# Patient Record
Sex: MALE | Race: BLACK OR AFRICAN AMERICAN | URBAN - METROPOLITAN AREA
[De-identification: names, ages, dates, MRNs, and addresses within clinical notes are randomized per-mention and may not be internally consistent; named-entity substitution may affect disease eponyms.]

---

## 2018-08-09 ENCOUNTER — OFFICE VISIT (OUTPATIENT)
Dept: FAMILY MEDICINE CLINIC | Age: 23
End: 2018-08-09

## 2018-08-09 VITALS
WEIGHT: 180 LBS | DIASTOLIC BLOOD PRESSURE: 79 MMHG | OXYGEN SATURATION: 99 % | RESPIRATION RATE: 18 BRPM | HEIGHT: 70 IN | TEMPERATURE: 98 F | SYSTOLIC BLOOD PRESSURE: 124 MMHG | HEART RATE: 72 BPM | BODY MASS INDEX: 25.77 KG/M2

## 2018-08-09 DIAGNOSIS — Z13.220 ENCOUNTER FOR SCREENING FOR LIPID DISORDER: ICD-10-CM

## 2018-08-09 DIAGNOSIS — Z11.3 ROUTINE SCREENING FOR STI (SEXUALLY TRANSMITTED INFECTION): ICD-10-CM

## 2018-08-09 DIAGNOSIS — Z00.00 ROUTINE GENERAL MEDICAL EXAMINATION AT A HEALTH CARE FACILITY: Primary | ICD-10-CM

## 2018-08-09 RX ORDER — CREATINE MONOHYDRATE 5000 MG
POWDER IN PACKET (EA) ORAL
COMMUNITY

## 2018-08-09 RX ORDER — BISMUTH SUBSALICYLATE 262 MG
1 TABLET,CHEWABLE ORAL DAILY
COMMUNITY

## 2018-08-09 NOTE — PROGRESS NOTES
[facility] Note  HPI:   Itzel Fajardo is a 23 y.o. male who presents to establish care and for CPE. Previous provider: PCP in [place]. He would like STI screening today. New committed relationship with female partner. No symptoms of STI such as dysuria, discharge or lesions. History of a persistently low blood cell count, he believes hgb. Has a normal bone marrow bx in high school. Denies any other PMH. Recent illness, onset 5 weeks ago, lasting for 1-2 weeks. High fever of 104 F, night sweats, headache, sore throat. A few days prior to illness he noticed 2 painful red bumps. One on back of left thigh that was raised, dime sized, and one bump on the back of left upper calf, raised red bump about a quarter size with a white head that eventually drained. Presented to Urgent treatment center, diagnosed with viral illness, negative for strep, and advised hydrocortisone cream to bumps. Symptoms have completely resolved and he feels back in good health. Lesions have healed. Otherwise in good health with no other complaints. No Known Allergies   There is no problem list on file for this patient. History reviewed. No pertinent past medical history. Past Surgical History:   Procedure Laterality Date    HX WISDOM TEETH EXTRACTION        No LMP for male patient.    Family History   Problem Relation Age of Onset    No Known Problems Mother     Hypertension Father     No Known Problems Sister     No Known Problems Brother     No Known Problems Maternal Grandmother     No Known Problems Maternal Grandfather     No Known Problems Paternal Grandmother     No Known Problems Paternal Grandfather     No Known Problems Sister     No Known Problems Sister     No Known Problems Sister       Social History     Social History    Marital status: SINGLE     Spouse name: N/A    Number of children: N/A    Years of education: N/A     Occupational History    Not on file. Social History Main Topics    Smoking status: Former Smoker     Types: Cigarettes    Smokeless tobacco: Never Used    Alcohol use No    Drug use: No    Sexual activity: Yes     Partners: Female     Other Topics Concern    Not on file     Social History Narrative    Moved from [place] for [employer] one     Exercise: 4-5 times per week. Diet: Counting macros, once per day                 ROS:   Review of Systems   Constitutional: Negative for chills, diaphoresis, fever, malaise/fatigue and weight loss. No unexplained weight changes   HENT: Negative for congestion, ear pain, hearing loss, nosebleeds, sore throat and tinnitus. Eyes: Negative for blurred vision, double vision and pain. Respiratory: Negative for cough, shortness of breath and wheezing. Cardiovascular: Negative for chest pain, palpitations, orthopnea, claudication and leg swelling. No dyspnea or chest pain with exertion, syncope   Gastrointestinal: Negative for abdominal pain, blood in stool, constipation, diarrhea, heartburn, nausea and vomiting. Genitourinary: Negative for dysuria, frequency and hematuria. Musculoskeletal: Negative for joint pain and myalgias. Skin: Negative for rash. Neurological: Negative for dizziness, speech change, seizures, weakness and headaches. Endo/Heme/Allergies: Negative for polydipsia. Psychiatric/Behavioral: Negative for depression. The patient is not nervous/anxious. Physical Exam:     Visit Vitals    /79 (BP 1 Location: Left arm, BP Patient Position: Sitting)    Pulse 72    Temp 98 °F (36.7 °C) (Oral)    Resp 18    Ht 5' 10\" (1.778 m)    Wt 180 lb (81.6 kg)    SpO2 99%    BMI 25.83 kg/m2        Vitals and Nurse Documentation reviewed. Physical Exam   Constitutional: He is oriented to person, place, and time and well-developed, well-nourished, and in no distress. HENT:   Head: Normocephalic and atraumatic.    Right Ear: Hearing, external ear and ear canal normal. Tympanic membrane is not erythematous. No middle ear effusion. Left Ear: Hearing, external ear and ear canal normal. Tympanic membrane is not erythematous. No middle ear effusion. Nose: Nose normal.   Mouth/Throat: Uvula is midline and oropharynx is clear and moist. Mucous membranes are not pale, not dry and not cyanotic. Normal dentition. No dental caries. No oropharyngeal exudate. Eyes: Conjunctivae and lids are normal. Pupils are equal, round, and reactive to light. Right conjunctiva is not injected. Left conjunctiva is not injected. Right eye exhibits normal extraocular motion. Neck: Normal range of motion and phonation normal. Neck supple. No tracheal deviation present. No thyroid mass and no thyromegaly present. Cardiovascular: Normal rate, regular rhythm, S1 normal, S2 normal, normal heart sounds and intact distal pulses. Exam reveals no gallop and no friction rub. No murmur heard. Pulmonary/Chest: Effort normal and breath sounds normal. No accessory muscle usage. No respiratory distress. He has no decreased breath sounds. He has no wheezes. He has no rhonchi. He has no rales. He exhibits no tenderness. Abdominal: Soft. Normal appearance and bowel sounds are normal. He exhibits no abdominal bruit. There is no hepatosplenomegaly. There is no tenderness. Musculoskeletal: Normal range of motion. He exhibits no edema, tenderness or deformity. Lymphadenopathy:        Head (right side): No submental, no submandibular, no tonsillar, no preauricular, no posterior auricular and no occipital adenopathy present. Head (left side): No submental, no submandibular, no tonsillar, no preauricular, no posterior auricular and no occipital adenopathy present. He has no cervical adenopathy. Right: No supraclavicular adenopathy present. Left: No supraclavicular adenopathy present. Neurological: He is alert and oriented to person, place, and time.  He has normal strength, normal reflexes and intact cranial nerves. Gait normal.   Skin: Skin is warm, dry and intact. No rash noted. He is not diaphoretic. No cyanosis. Nails show no clubbing. Psychiatric: Mood and affect normal.   Nursing note and vitals reviewed. Assessment/ Plan:   [employer] were discussed including hours of operation, on-call providers, and MyChart. Diagnoses and all orders for this visit:    1. Routine general medical examination at a health care facility: Healthy 23year old male, fully recovered from recent viral illness 5 weeks ago. Encouraged to RTC if he develops new or returning symptoms. Routine screening labs today. -     METABOLIC PANEL, COMPREHENSIVE  -     CBC WITH AUTOMATED DIFF    2. Encounter for screening for lipid disorder: Non-fasting labs today, [employer] 2-3 hours ago. -     LIPID PANEL    3. Routine screening for STI (sexually transmitted infection) :Discussed safe sex practices.    -     RPR  -     CHRONIC HEPATITIS PANEL  -     HIV 1/2 AG/AB, 4TH GENERATION,W RFLX CONFIRM  -     CT+NG+M GENITALIUM BY YUNIER, UR      Follow-up Disposition:  Return in about 1 year (around 8/9/2019) for Routine Physical Exam.     Discussed expected course/resolution/complications of diagnosis in detail with patient.    Medication risks/benefits/costs/interactions/alternatives discussed with patient.    Pt was given an after visit summary which includes diagnoses, current medications & vitals.  Pt expressed understanding with the diagnosis and plan

## 2018-08-09 NOTE — PATIENT INSTRUCTIONS

## 2018-08-09 NOTE — PROGRESS NOTES
Chief Complaint   Patient presents with   [facility] Establish Care     patient fasting     Complete Physical     1. Have you been to the ER, urgent care clinic since your last visit? Hospitalized since your last visit? No Urgent care 1 month ago for fever. 2. Have you seen or consulted any other health care providers outside of the [address] [place] [address] since your last visit? Include any pap smears or colon screening.  No

## 2018-08-09 NOTE — MR AVS SNAPSHOT
[address] 
[phone] Patient: Georgi Mohs MRN: [id number] :1995 Visit Information Date & Time Provider Department Dept. Phone Encounter #  
 2018  1:00 PM Juanita Boland  [facility] [phone] [id number] Follow-up Instructions Return in about 1 year (around 2019) for Routine Physical Exam. Upcoming Health Maintenance Date Due DTaP/Tdap/Td series (1 - Tdap) 2014 Influenza Age 5 to Adult 2018* *Topic was postponed. The date shown is not the original due date. Allergies as of 2018  Review Complete On: 2018 By: Juanita Boland NP No Known Allergies Current Immunizations  Never Reviewed No immunizations on file. Not reviewed this visit You Were Diagnosed With   
  
 Codes Comments Routine general medical examination at a health care facility    -  Primary ICD-10-CM: Z00.00 ICD-9-CM: V70.0 Encounter for screening for lipid disorder     ICD-10-CM: Z13.220 ICD-9-CM: V77.91 Routine screening for STI (sexually transmitted infection)     ICD-10-CM: Z11.3 ICD-9-CM: V74.5 Vitals BP Pulse Temp Resp Height(growth percentile) Weight(growth percentile) 124/79 (BP 1 Location: Left arm, BP Patient Position: Sitting) 72 98 °F (36.7 °C) (Oral) 18 5' 10\" (1.778 m) 180 lb (81.6 kg) SpO2 BMI Smoking Status 99% 25.83 kg/m2 Former Smoker Vitals History BMI and BSA Data Body Mass Index Body Surface Area  
 25.83 kg/m 2 2.01 m 2 Your Updated Medication List  
  
   
This list is accurate as of 18  1:44 PM.  Always use your most recent med list.  
  
  
  
  
 creatine monohydrate 5,000 mg Pwpk Take  by mouth. omega 3-dha-epa-fish oil 100-160-1,000 mg Cap Take  by mouth. We Performed the Following CBC WITH AUTOMATED DIFF [02658 CPT(R)] CHRONIC HEPATITIS PANEL [GVF5504 Custom] CT+NG+M GENITALIUM BY YUNIER, UR [RJC92993 Custom] HIV 1/2 AG/AB, 4TH GENERATION,W RFLX CONFIRM [id number] CPT(R)] LIPID PANEL [48036 CPT(R)] METABOLIC PANEL, COMPREHENSIVE [36313 CPT(R)] RPR [92064 CPT(R)] Follow-up Instructions Return in about 1 year (around 8/9/2019) for Routine Physical Exam.  
  
  
Patient Instructions Well Visit, Ages 25 to 48: Care Instructions Your Care Instructions Physical exams can help you stay healthy. Your doctor has checked your overall health and may have suggested ways to take good care of yourself. He or she also may have recommended tests. At home, you can help prevent illness with healthy eating, regular exercise, and other steps. Follow-up care is a key part of your treatment and safety. Be sure to make and go to all appointments, and call your doctor if you are having problems. It's also a good idea to know your test results and keep a list of the medicines you take. How can you care for yourself at home? · Reach and stay at a healthy weight. This will lower your risk for many problems, such as obesity, diabetes, heart disease, and high blood pressure. · Get at least 30 minutes of physical activity on most days of the week. Walking is a good choice. You also may want to do other activities, such as running, swimming, cycling, or playing tennis or team sports. Discuss any changes in your exercise program with your doctor. · Do not smoke or allow others to smoke around you. If you need help quitting, talk to your doctor about stop-smoking programs and medicines. These can increase your chances of quitting for good. · Talk to your doctor about whether you have any risk factors for sexually transmitted infections (STIs). Having one sex partner (who does not have STIs and does not have sex with anyone else) is a good way to avoid these infections. · Use birth control if you do not want to have children at this time. Talk with your doctor about the choices available and what might be best for you. · Protect your skin from too much sun. When you're outdoors from 10 a.m. to 4 p.m., stay in the shade or cover up with clothing and a hat with a wide brim. Wear sunglasses that block UV rays. Even when it's cloudy, put broad-spectrum sunscreen (SPF 30 or higher) on any exposed skin. · See a dentist one or two times a year for checkups and to have your teeth cleaned. · Wear a seat belt in the car. · Drink alcohol in moderation, if at all. That means no more than 2 drinks a day for men and 1 drink a day for women. Follow your doctor's advice about when to have certain tests. These tests can spot problems early. For everyone · Cholesterol. Have the fat (cholesterol) in your blood tested after age 21. Your doctor will tell you how often to have this done based on your age, family history, or other things that can increase your risk for heart disease. · Blood pressure. Have your blood pressure checked during a routine doctor visit. Your doctor will tell you how often to check your blood pressure based on your age, your blood pressure results, and other factors. · Vision. Talk with your doctor about how often to have a glaucoma test. 
· Diabetes. Ask your doctor whether you should have tests for diabetes. · Colon cancer. Have a test for colon cancer at age 48. You may have one of several tests. If you are younger than 48, you may need a test earlier if you have any risk factors. Risk factors include whether you already had a precancerous polyp removed from your colon or whether your parent, brother, sister, or child has had colon cancer. For women · Breast exam and mammogram. Talk to your doctor about when you should have a clinical breast exam and a mammogram. Medical experts differ on whether and how often women under 50 should have these tests.  Your doctor can help you decide what is right for you. · Pap test and pelvic exam. Begin Pap tests at age 24. A Pap test is the best way to find cervical cancer. The test often is part of a pelvic exam. Ask how often to have this test. 
· Tests for sexually transmitted infections (STIs). Ask whether you should have tests for STIs. You may be at risk if you have sex with more than one person, especially if your partners do not wear condoms. For men · Tests for sexually transmitted infections (STIs). Ask whether you should have tests for STIs. You may be at risk if you have sex with more than one person, especially if you do not wear a condom. · Testicular cancer exam. Ask your doctor whether you should check your testicles regularly. · Prostate exam. Talk to your doctor about whether you should have a blood test (called a PSA test) for prostate cancer. Experts differ on whether and when men should have this test. Some experts suggest it if you are older than 39 and are -American or have a father or brother who got prostate cancer when he was younger than 72. When should you call for help? Watch closely for changes in your health, and be sure to contact your doctor if you have any problems or symptoms that concern you. Where can you learn more? Go to http://felipa-ismael.info/. Enter P072 in the search box to learn more about \"Well Visit, Ages 25 to 48: Care Instructions. \" Current as of: May 16, 2017 Content Version: 11.7 © 3682-1669 [employer]. Care instructions adapted under license by [employer] (which disclaims liability or warranty for this information). If you have questions about a medical condition or this instruction, always ask your healthcare professional. [place] [address] any warranty or liability for your use of this information. Introducing [facility]!    
 [facility] introduces canvs.co patient portal. Now you can access parts of your medical record, email your doctor's office, and request medication refills online. 1. In your internet browser, go to [email or web address] "[email or web address]/[email or web address] 2. Click on the First Time User? Click Here link in the Sign In box. You will see the New Member Sign Up page. 3. Enter your [employer] Access Code exactly as it appears below. You will not need to use this code after youve completed the sign-up process. If you do not sign up before the expiration date, you must request a new code. · [employer] Access Code: [email or web address] Expires: 11/7/2018  1:44 PM 
 
4. Enter the last four digits of your Social Security Number (xxxx) and Date of Birth (mm/dd/yyyy) as indicated and click Submit. You will be taken to the next sign-up page. 5. Create a [employer] ID. This will be your [employer] login ID and cannot be changed, so think of one that is secure and easy to remember. 6. Create a [employer] password. You can change your password at any time. 7. Enter your Password Reset Question and Answer. This can be used at a later time if you forget your password. 8. Enter your e-mail address. You will receive e-mail notification when new information is available in [address] 9. Click Sign Up. You can now view and download portions of your medical record. 10. Click the Download Summary menu link to download a portable copy of your medical information. If you have questions, please visit the Frequently Asked Questions section of the [employer] website. Remember, [employer] is NOT to be used for urgent needs. For medical emergencies, dial 911. Now available from your iPhone and Android! Please provide this summary of care documentation to your next provider. Your primary care clinician is listed as Gabrielle Chou. If you have any questions after today's visit, please call [phone].

## 2018-08-10 LAB
ALBUMIN SERPL-MCNC: 4.6 G/DL (ref 3.5–5.5)
ALBUMIN/GLOB SERPL: 1.2 {RATIO} (ref 1.2–2.2)
ALP SERPL-CCNC: 71 IU/L (ref 39–117)
ALT SERPL-CCNC: 27 IU/L (ref 0–44)
AST SERPL-CCNC: 39 IU/L (ref 0–40)
BASOPHILS # BLD AUTO: 0 X10E3/UL (ref 0–0.2)
BASOPHILS NFR BLD AUTO: 0 %
BILIRUB SERPL-MCNC: 0.4 MG/DL (ref 0–1.2)
BUN SERPL-MCNC: 13 MG/DL (ref 6–20)
BUN/CREAT SERPL: 14 (ref 9–20)
CALCIUM SERPL-MCNC: 9.9 MG/DL (ref 8.7–10.2)
CHLORIDE SERPL-SCNC: 100 MMOL/L (ref 96–106)
CHOLEST SERPL-MCNC: 113 MG/DL (ref 100–199)
CO2 SERPL-SCNC: 24 MMOL/L (ref 20–29)
CREAT SERPL-MCNC: 0.91 MG/DL (ref 0.76–1.27)
EOSINOPHIL # BLD AUTO: 0 X10E3/UL (ref 0–0.4)
EOSINOPHIL NFR BLD AUTO: 0 %
ERYTHROCYTE [DISTWIDTH] IN BLOOD BY AUTOMATED COUNT: 14.7 % (ref 12.3–15.4)
GLOBULIN SER CALC-MCNC: 3.8 G/DL (ref 1.5–4.5)
GLUCOSE SERPL-MCNC: 69 MG/DL (ref 65–99)
HCT VFR BLD AUTO: 41.9 % (ref 37.5–51)
HDLC SERPL-MCNC: 63 MG/DL
HGB BLD-MCNC: 13.8 G/DL (ref 13–17.7)
HIV 1+2 AB+HIV1 P24 AG SERPL QL IA: NON REACTIVE
IMM GRANULOCYTES # BLD: 0 X10E3/UL (ref 0–0.1)
IMM GRANULOCYTES NFR BLD: 0 %
INTERPRETATION, 910389: NORMAL
LDLC SERPL CALC-MCNC: 42 MG/DL (ref 0–99)
LYMPHOCYTES # BLD AUTO: 1.5 X10E3/UL (ref 0.7–3.1)
LYMPHOCYTES NFR BLD AUTO: 66 %
MCH RBC QN AUTO: 27.8 PG (ref 26.6–33)
MCHC RBC AUTO-ENTMCNC: 32.9 G/DL (ref 31.5–35.7)
MCV RBC AUTO: 84 FL (ref 79–97)
MONOCYTES # BLD AUTO: 0.2 X10E3/UL (ref 0.1–0.9)
MONOCYTES NFR BLD AUTO: 10 %
MORPHOLOGY BLD-IMP: ABNORMAL
NEUTROPHILS # BLD AUTO: 0.5 X10E3/UL (ref 1.4–7)
NEUTROPHILS NFR BLD AUTO: 24 %
PLATELET # BLD AUTO: 255 X10E3/UL (ref 150–379)
POTASSIUM SERPL-SCNC: 4.4 MMOL/L (ref 3.5–5.2)
PROT SERPL-MCNC: 8.4 G/DL (ref 6–8.5)
RBC # BLD AUTO: 4.97 X10E6/UL (ref 4.14–5.8)
RPR SER QL: NON REACTIVE
SODIUM SERPL-SCNC: 137 MMOL/L (ref 134–144)
TRIGL SERPL-MCNC: 39 MG/DL (ref 0–149)
VLDLC SERPL CALC-MCNC: 8 MG/DL (ref 5–40)
WBC # BLD AUTO: 2.3 X10E3/UL (ref 3.4–10.8)

## 2018-08-13 ENCOUNTER — TELEPHONE (OUTPATIENT)
Dept: FAMILY MEDICINE CLINIC | Age: 23
End: 2018-08-13

## 2018-08-13 DIAGNOSIS — R79.89 ABNORMAL CBC: Primary | ICD-10-CM

## 2018-08-13 NOTE — PROGRESS NOTES
[phone] attempted to call patient no answer left message to call me back in regards to his labs

## 2018-08-13 NOTE — TELEPHONE ENCOUNTER
[phone] attempted to call patient no answer left message to call me back in regards to his labs

## 2018-08-13 NOTE — TELEPHONE ENCOUNTER
----- Message from Gina Gage sent at 8/13/2018  2:39 PM EDT -----  Regarding: Dr. Hillary Vazquez, Telephone  Pt requesting a call back from a missed call.  Pt best contact number is [phone]

## 2018-08-13 NOTE — PROGRESS NOTES
Please call patient: WBC count was low, Advise for him to return in 2-3 weeks to repeat this lab (lab only visit). Cholesterol is normal. Liver function, kidney function, blood sugar is normal. Syphilis, HIV Negative. Urine is still pending. Thank you!

## 2018-08-17 LAB
C TRACH RRNA UR QL NAA+PROBE: NEGATIVE
COMMENT, 180044: NORMAL
M GENITALIUM DNA SPEC QL NAA+PROBE: NEGATIVE
N GONORRHOEA RRNA UR QL NAA+PROBE: NEGATIVE

## 2018-08-17 NOTE — PROGRESS NOTES
Hi Mr. Joeseph Litten. Your urine results were normal, negative for STI's. As my nurse already mentioned to you, please return in 2-3 weeks for a lab only visit to recheck your WBC level. Please let me know if you have any additional questions.     Emilee Moran NP

## 2018-12-13 ENCOUNTER — OFFICE VISIT (OUTPATIENT)
Dept: FAMILY MEDICINE CLINIC | Age: 23
End: 2018-12-13

## 2018-12-13 VITALS
OXYGEN SATURATION: 99 % | HEIGHT: 70 IN | BODY MASS INDEX: 28.92 KG/M2 | HEART RATE: 68 BPM | TEMPERATURE: 98.3 F | SYSTOLIC BLOOD PRESSURE: 116 MMHG | RESPIRATION RATE: 16 BRPM | DIASTOLIC BLOOD PRESSURE: 67 MMHG | WEIGHT: 202 LBS

## 2018-12-13 DIAGNOSIS — S93.492A SPRAIN OF ANTERIOR TALOFIBULAR LIGAMENT OF LEFT ANKLE, INITIAL ENCOUNTER: Primary | ICD-10-CM

## 2018-12-13 DIAGNOSIS — R09.81 NASAL CONGESTION: ICD-10-CM

## 2018-12-13 DIAGNOSIS — Z11.3 ROUTINE SCREENING FOR STI (SEXUALLY TRANSMITTED INFECTION): ICD-10-CM

## 2018-12-13 RX ORDER — ASCORBIC ACID 250 MG
TABLET ORAL
COMMUNITY

## 2018-12-13 NOTE — PATIENT INSTRUCTIONS
Ankle Sprain: Rehab Exercises  Your Care Instructions  Here are some examples of typical rehabilitation exercises for your condition. Start each exercise slowly. Ease off the exercise if you start to have pain. Your doctor or physical therapist will tell you when you can start these exercises and which ones will work best for you. How to do the exercises  \"Alphabet\" exercise    1. Trace the alphabet with your toe. This helps your ankle move in all directions. Side-to-side knee swing exercise    1. Sit in a chair with your foot flat on the floor. 2. Slowly move your knee from side to side. Keep your foot pressed flat. 3. Continue this exercise for 2 to 3 minutes. Towel curl    1. While sitting, place your foot on a towel on the floor. Scrunch the towel toward you with your toes. 2. Then use your toes to push the towel away from you. 3. To make this exercise more challenging you can put something on the other end of the towel. A can of soup is about the right weight for this. Towel stretch    1. Sit with your legs extended and knees straight. 2. Place a towel around your foot just under the toes. 3. Hold each end of the towel in each hand, with your hands above your knees. 4. Pull back with the towel so that your foot stretches toward you. 5. Hold the position for at least 15 to 30 seconds. 6. Repeat 2 to 4 times a session. Do up to 5 sessions a day. Ankle eversion exercise    1. Start by sitting with your foot flat on the floor. Push your foot outward against a wall or a piece of furniture that doesn't move. Hold for about 6 seconds, and relax. Repeat 8 to 12 times. 2. After you feel comfortable with this, try using rubber tubing looped around the outside of your feet for resistance. Push your foot out to the side against the tubing, and then count to 10 as you slowly bring your foot back to the middle. Repeat 8 to 12 times. Isometric opposition exercises    1.  While sitting, put your feet together flat on the floor. 2. Press your injured foot inward against your other foot. Hold for about 6 seconds, and relax. Repeat 8 to 12 times. 3. Then place the heel of your other foot on top of the injured one. Push down with the top heel while trying to push up with your injured foot. Hold for about 6 seconds, and relax. Repeat 8 to 12 times. Resisted ankle inversion    1. Sit on the floor with your good leg crossed over your other leg. 2. Hold both ends of an exercise band and loop the band around the inside of your affected foot. Then press your other foot against the band. 3. Keeping your legs crossed, slowly push your affected foot against the band so that foot moves away from your other foot. Then slowly relax. 4. Repeat 8 to 12 times. Resisted ankle eversion    1. Sit on the floor with your legs straight. 2. Hold both ends of an exercise band and loop the band around the outside of your affected foot. Then press your other foot against the band. 3. Keeping your leg straight, slowly push your affected foot outward against the band and away from your other foot without letting your leg rotate. Then slowly relax. 4. Repeat 8 to 12 times. Resisted ankle dorsiflexion    1. Tie the ends of an exercise band together to form a loop. Attach one end of the loop to a secure object or shut a door on it to hold it in place. (Or you can have someone hold one end of the loop to provide resistance.)  2. While sitting on the floor or in a chair, loop the other end of the band over the top of your affected foot. 3. Keeping your knee and leg straight, slowly flex your foot to pull back on the exercise band, and then slowly relax. 4. Repeat 8 to 12 times. Single-leg balance    1. Stand on a flat surface with your arms stretched out to your sides like you are making the letter \"T. \" Then lift your good leg off the floor, bending it at the knee.  If you are not steady on your feet, use one hand to hold on to a chair, counter, or wall. 2. Standing on the leg with your affected ankle, keep that knee straight. Try to balance on that leg for up to 30 seconds. Then rest for up to 10 seconds. 3. Repeat 6 to 8 times. 4. When you can balance on your affected leg for 30 seconds with your eyes open, try to balance on it with your eyes closed. 5. When you can do this exercise with your eyes closed for 30 seconds and with ease and no pain, try standing on a pillow or piece of foam, and repeat steps 1 through 4. Follow-up care is a key part of your treatment and safety. Be sure to make and go to all appointments, and call your doctor if you are having problems. It's also a good idea to know your test results and keep a list of the medicines you take. Where can you learn more? Go to http://felipaAstute Networksismael.info/. Kimani Lawrence in the search box to learn more about \"Ankle Sprain: Rehab Exercises. \"  Current as of: November 29, 2017  Content Version: 11.8  © 9672-8144 [employer]. Care instructions adapted under license by [employer] (which disclaims liability or warranty for this information). If you have questions about a medical condition or this instruction, always ask your healthcare professional. [address] any warranty or liability for your use of this information. Ankle Sprain: Care Instructions  Your Care Instructions    An ankle sprain can happen when you twist your ankle. The ligaments that support the ankle can get stretched and torn. Often the ankle is swollen and painful. Ankle sprains may take from several weeks to several months to heal. Usually, the more pain and swelling you have, the more severe your ankle sprain is and the longer it will take to heal. You can heal faster and regain strength in your ankle with good home treatment.   It is very important to give your ankle time to heal completely, so that you do not easily hurt your ankle again. Follow-up care is a key part of your treatment and safety. Be sure to make and go to all appointments, and call your doctor if you are having problems. It's also a good idea to know your test results and keep a list of the medicines you take. How can you care for yourself at home? · Prop up your foot on pillows as much as possible for the next 3 days. Try to keep your ankle above the level of your heart. This will help reduce the swelling. · Follow your doctor's directions for wearing a splint or elastic bandage. Wrapping the ankle may help reduce or prevent swelling. · Your doctor may give you a splint, a brace, an air stirrup, or another form of ankle support to protect your ankle until it is healed. Wear it as directed while your ankle is healing. Do not remove it unless your doctor tells you to. After your ankle has healed, ask your doctor whether you should wear the brace when you exercise. · Put ice or cold packs on your injured ankle for 10 to 20 minutes at a time. Try to do this every 1 to 2 hours for the next 3 days (when you are awake) or until the swelling goes down. Put a thin cloth between the ice and your skin. · You may need to use crutches until you can walk without pain. If you do use crutches, try to bear some weight on your injured ankle if you can do so without pain. This helps the ankle heal.  · Take pain medicines exactly as directed. ? If the doctor gave you a prescription medicine for pain, take it as prescribed. ? If you are not taking a prescription pain medicine, ask your doctor if you can take an over-the-counter medicine. · If you have been given ankle exercises to do at home, do them exactly as instructed. These can promote healing and help prevent lasting weakness. When should you call for help?   Call your doctor now or seek immediate medical care if:    · Your pain is getting worse.     · Your swelling is getting worse.     · Your splint feels too tight or you are unable to loosen it.    Watch closely for changes in your health, and be sure to contact your doctor if:    · You are not getting better after 1 week. Where can you learn more? Go to http://felipa-ismael.info/. Enter Q412 in the search box to learn more about \"Ankle Sprain: Care Instructions. \"  Current as of: November 29, 2017  Content Version: 11.8  © 2758-6225 [employer]. Care instructions adapted under license by [employer] (which disclaims liability or warranty for this information). If you have questions about a medical condition or this instruction, always ask your healthcare professional. [place] [address] any warranty or liability for your use of this information. Learning About Safer Sex for Teens  What is safer sex? Safer sex is a way to help you avoid an infection spread through sex. It can also help prevent pregnancy. It may seems strange or uncomfortable to talk about sex. But the more you know, the safer you are. And the actions you take before sex can help keep you from getting an infection like herpes or a deadly infection like HIV. You can get a sexually transmitted infection (STI) from any kind of sexual contact, not just intercourse. STIs are spread through skin-to-skin contact between the genitals. You can also get an STI from contact with body fluids such as semen, vaginal fluids, and blood (including menstrual blood). This means you can get an STI from vaginal sex, anal sex, or oral sex. You may have heard this before, but not having sex at all is still the best way to prevent pregnancy and any STI. How can you protect yourself from STIs? A condom is one of the best ways to lower your chance of STIs. You may know about condoms for men. Did you know there are condoms for women too? The female condom is a tube of soft plastic with a closed end that is put deep into the vagina.   · Use condoms or female condoms each time and every time you have sex. ? Condoms come in several sizes. Make sure you use the right size. A condom that is too small can break easily. A condom that is too big can slip off during sex. Use a new condom each time you have sex. ? Be careful not to poke a hole in the condom when you open the wrapper. ? Never use petroleum jelly (such as Vaseline), grease, hand lotion, baby oil, or anything with oil in it. These products can make holes in the condom. ? After sex, hold the condom on your penis as you remove your penis from your partner. This will keep semen from spilling out of the condom. · Do not use a female condom and male condom at the same time. · Do not have sex with anyone who has symptoms of an STI, such as sores on the genitals or mouth. The herpes virus that causes cold sores can spread to and from the penis and vagina. · Think about getting shots to prevent hepatitis A and hepatitis B, if you haven't already had these shots. You can get both of these diseases through sex. A dental dam is a special rubber sheet that you can use for protection during oral sex. How can you prevent pregnancy? Remember that birth control methods such as diaphragms, IUDs, foams, and birth control pills do not stop you from getting STIs. These are some safer sex things you can do to help avoid pregnancy:  · Use some type of birth control every time you have sex. · Don't drink a lot of alcohol or use drugs before sex. This can cause you to let down your guard. And then you're not thinking clearly about safer sex. How else can you take care of yourself? · Talk to your partner before you have sex. Find out if he or she has or is at risk for any STI. Keep in mind that a person may be able to spread an STI even if he or she does not have symptoms. You and your partner may want to get an HIV test. You should get tested again 6 months later. · You should never feel pressured to have sex.  It's okay to say \"no\" anytime you want to stop. · It's important to feel safe with your sex partner and with the activities you are doing together. If you don't feel safe, talk with an adult you trust.  Follow-up care is a key part of your treatment and safety. Be sure to make and go to all appointments, and call your doctor if you are having problems. It's also a good idea to know your test results and keep a list of the medicines you take. Where can you learn more? Go to http://felipa-ismael.info/. Enter P218 in the search box to learn more about \"Learning About Safer Sex for Teens. \"  Current as of: November 27, 2017  Content Version: 11.8  © 6748-1515 [employer]. Care instructions adapted under license by [employer] (which disclaims liability or warranty for this information). If you have questions about a medical condition or this instruction, always ask your healthcare professional. [address] any warranty or liability for your use of this information.

## 2018-12-13 NOTE — PROGRESS NOTES
Chief Complaint   Patient presents with    Ankle Pain     left ankle has been hurting for 3 weeks. pt was playing basketball and landed wrong.  Labs     pt wants STD testing     \"REVIEWED RECORD IN PREPARATION FOR VISIT AND HAVE OBTAINED THE NECESSARY DOCUMENTATION\"  1. Have you been to the ER, urgent care clinic since your last visit? Hospitalized since your last visit? No    2. Have you seen or consulted any other health care providers outside of the [address] [place] [address] since your last visit? Include any pap smears or colon screening.  No

## 2018-12-13 NOTE — PROGRESS NOTES
[facility] Note  Subjective:      Montez Gotti is a 20 y.o. male who presents for an acute visit with the following chief complaints. Chief Complaint   Patient presents with    Ankle Pain     left ankle has been hurting for 3 weeks. pt was playing basketball and landed wrong.  Labs     pt wants STD testing       Ankle Pain  Patient complains of left ankle pain. Onset of the symptoms was 3-4 weeks ago. Inciting event: injured while playing basketball; came down on ankle funny and it rolled inwared. No popping sounds. Initial swelling and pain but able to walk on foot. Current symptoms include ability to bear weight, but with some pain, pain at the anterior and lateral aspect of the ankle and pain with inversion and plnatar flexion of the foot. Aggravating symptoms: certain positions. Patient's course of pain: gradually improving, course of stiffness: essentially resolved and course of swelling: resolved. Patient has had no prior ankle problems. Previous visits for this problem: none. Evaluation to date: none. Treatment to date: RICE. Chronic, intermittent nasal congestion. Can be either nare. No trauma to nose. Seasonal allergies, not bad currently. He has tried nasal steroids with little relief. He is curious about nasal rinses. No other URI symptoms. He has had recent unprotected sex and he is requesting STI screening. Current Outpatient Medications   Medication Sig Dispense Refill    ascorbic acid, vitamin C, (VITAMIN C) 250 mg tablet Take  by mouth.  creatine monohydrate 5,000 mg pwpk Take  by mouth.  omega 3-dha-epa-fish oil 100-160-1,000 mg cap Take  by mouth.  multivitamin (ONE A DAY) tablet Take 1 Tab by mouth daily. No Known Allergies    ROS:   Complete review of systems was reviewed with pertinent information listed in HPI.   Review of Systems   Constitutional: Negative for chills, diaphoresis, fever, malaise/fatigue and weight loss. HENT: Positive for congestion. Negative for ear pain, hearing loss, sinus pain and tinnitus. Eyes: Negative for blurred vision. Respiratory: Negative for cough and shortness of breath. Cardiovascular: Negative for chest pain and palpitations. Gastrointestinal: Negative for abdominal pain, constipation, diarrhea, nausea and vomiting. Genitourinary: Negative for dysuria. Musculoskeletal: Positive for joint pain. Negative for myalgias. Skin: Negative for rash. Neurological: Negative for dizziness. Psychiatric/Behavioral: Negative. Objective:     Visit Vitals  /67 (BP 1 Location: Left arm, BP Patient Position: Sitting)   Pulse 68   Temp 98.3 °F (36.8 °C) (Oral)   Resp 16   Ht 5' 10\" (1.778 m)   Wt 202 lb (91.6 kg)   SpO2 99%   BMI 28.98 kg/m²       Vitals and Nurse Documentation reviewed. Physical Exam   Constitutional: He is oriented to person, place, and time and well-developed, well-nourished, and in no distress. He does not have a sickly appearance. HENT:   Head: Normocephalic and atraumatic. Right Ear: Hearing, external ear and ear canal normal. Tympanic membrane is not erythematous and not bulging. No middle ear effusion. Left Ear: Hearing, external ear and ear canal normal. Tympanic membrane is not erythematous and not bulging. No middle ear effusion. Nose: Mucosal edema and septal deviation present. No rhinorrhea. Right sinus exhibits no maxillary sinus tenderness and no frontal sinus tenderness. Left sinus exhibits no maxillary sinus tenderness and no frontal sinus tenderness. Mouth/Throat: Uvula is midline, oropharynx is clear and moist and mucous membranes are normal. Mucous membranes are not pale, not dry and not cyanotic. No oropharyngeal exudate, posterior oropharyngeal edema, posterior oropharyngeal erythema or tonsillar abscesses. Slight nasal deviation to the left. Boggy left lower nasal turbinate.     Eyes: Conjunctivae and EOM are normal. Pupils are equal, round, and reactive to light. Right conjunctiva is not injected. Neck: Normal range of motion. Neck supple. No tracheal deviation present. Cardiovascular: Normal heart sounds. Pulses:       Dorsalis pedis pulses are 2+ on the right side, and 2+ on the left side. Pulmonary/Chest: Effort normal and breath sounds normal.   Musculoskeletal: He exhibits no edema. Left ankle: He exhibits normal range of motion, no swelling, no ecchymosis and no deformity. Tenderness. AITFL tenderness found. Lymphadenopathy:     He has no cervical adenopathy. Neurological: He is alert and oriented to person, place, and time. Gait normal.   Skin: Skin is warm, dry and intact. No ecchymosis and no rash noted. He is not diaphoretic. Psychiatric: Mood and affect normal.   Nursing note and vitals reviewed. Assessment/Plan:     Diagnoses and all orders for this visit:    1. Sprain of anterior talofibular ligament of left ankle, initial encounter: Acute onset left lateral and anterior ankle pain after known injury. Slowly resolving. Likely sprain. X-ray to rule out fracture. Continue NSAID's as needed, ICE, start home rehab exercises. Consider PT if symptoms persist.    -     XR ANKLE LT MIN 3 V; Future    2. Routine screening for STI (sexually transmitted infection): Asymptomatic. Discussed safer sex practices. -     RPR  -     HIV 1/2 AG/AB, 4TH GENERATION,W RFLX CONFIRM  -     CBC WITH AUTOMATED DIFF  -     CHRONIC HEPATITIS PANEL  -     CHLAMYDIA/NEISSERIA BY YUNIER W/REFLEX CONFIRM    3. Nasal congestion: Chronic, intermittent. Start intranasal steroids, nasal saline rinses, consider ENT referral for further eval if there is no improvement. Pt expressed understanding with the diagnosis and plan    Follow-up Disposition:  Return if symptoms worsen or fail to improve.     Discussed expected course/resolution/complications of diagnosis in detail with patient.    Medication risks/benefits/costs/interactions/alternatives discussed with patient.    Pt was given an after visit summary which includes diagnoses, current medications & vitals.  Pt expressed understanding with the diagnosis and plan

## 2018-12-16 LAB
BASOPHILS # BLD AUTO: 0 X10E3/UL (ref 0–0.2)
BASOPHILS NFR BLD AUTO: 0 %
C TRACH RRNA SPEC QL NAA+PROBE: NEGATIVE
COMMENT, 144067: NORMAL
EOSINOPHIL # BLD AUTO: 0 X10E3/UL (ref 0–0.4)
EOSINOPHIL NFR BLD AUTO: 1 %
ERYTHROCYTE [DISTWIDTH] IN BLOOD BY AUTOMATED COUNT: 13.6 % (ref 12.3–15.4)
HBV CORE AB SERPL QL IA: NEGATIVE
HBV CORE IGM SERPL QL IA: NEGATIVE
HBV E AB SERPL QL IA: NEGATIVE
HBV E AG SERPL QL IA: NEGATIVE
HBV SURFACE AB SER QL: REACTIVE
HBV SURFACE AG SERPL QL IA: NEGATIVE
HCT VFR BLD AUTO: 42.1 % (ref 37.5–51)
HCV AB S/CO SERPL IA: <0.1 S/CO RATIO (ref 0–0.9)
HGB BLD-MCNC: 14.2 G/DL (ref 13–17.7)
HIV 1+2 AB+HIV1 P24 AG SERPL QL IA: NON REACTIVE
IMM GRANULOCYTES # BLD: 0 X10E3/UL (ref 0–0.1)
IMM GRANULOCYTES NFR BLD: 0 %
LYMPHOCYTES # BLD AUTO: 1.7 X10E3/UL (ref 0.7–3.1)
LYMPHOCYTES NFR BLD AUTO: 59 %
MCH RBC QN AUTO: 28.2 PG (ref 26.6–33)
MCHC RBC AUTO-ENTMCNC: 33.7 G/DL (ref 31.5–35.7)
MCV RBC AUTO: 84 FL (ref 79–97)
MONOCYTES # BLD AUTO: 0.4 X10E3/UL (ref 0.1–0.9)
MONOCYTES NFR BLD AUTO: 13 %
MORPHOLOGY BLD-IMP: ABNORMAL
N GONORRHOEA RRNA SPEC QL NAA+PROBE: NEGATIVE
NEUTROPHILS # BLD AUTO: 0.8 X10E3/UL (ref 1.4–7)
NEUTROPHILS NFR BLD AUTO: 27 %
PLATELET # BLD AUTO: 232 X10E3/UL (ref 150–379)
RBC # BLD AUTO: 5.04 X10E6/UL (ref 4.14–5.8)
RPR SER QL: NON REACTIVE
WBC # BLD AUTO: 2.9 X10E3/UL (ref 3.4–10.8)

## 2018-12-27 NOTE — PROGRESS NOTES
Called and reached, results reviewed. States he is trying to remember name of last hematologist. He will sign release for records then.

## 2019-04-08 ENCOUNTER — OFFICE VISIT (OUTPATIENT)
Dept: FAMILY MEDICINE CLINIC | Age: 24
End: 2019-04-08

## 2019-04-08 VITALS
HEART RATE: 65 BPM | TEMPERATURE: 97.5 F | BODY MASS INDEX: 28.63 KG/M2 | HEIGHT: 70 IN | OXYGEN SATURATION: 99 % | SYSTOLIC BLOOD PRESSURE: 123 MMHG | DIASTOLIC BLOOD PRESSURE: 75 MMHG | WEIGHT: 200 LBS | RESPIRATION RATE: 16 BRPM

## 2019-04-08 DIAGNOSIS — Z20.828 EXPOSURE TO HERPES SIMPLEX VIRUS (HSV): Primary | ICD-10-CM

## 2019-04-08 RX ORDER — VALACYCLOVIR HYDROCHLORIDE 1 G/1
TABLET, FILM COATED ORAL
Refills: 0 | COMMUNITY
Start: 2019-04-02 | End: 2020-01-10

## 2019-04-08 RX ORDER — OMEGA-3-ACID ETHYL ESTERS 1 G/1
CAPSULE, LIQUID FILLED ORAL
COMMUNITY
End: 2020-01-10

## 2019-04-08 RX ORDER — VALACYCLOVIR HYDROCHLORIDE 1 G/1
1000 TABLET, FILM COATED ORAL
COMMUNITY
Start: 2019-04-02 | End: 2019-04-08

## 2019-04-08 NOTE — PROGRESS NOTES
[facility] Note  Subjective:      Martha Chang is a 20 y.o. male who presents for an acute visit with the following chief complaints. Chief Complaint   Patient presents with   [facility]     pt is asking for STD screening. pt seen at [address] [place] [address] and is being treated for herpes. Martha Chang is a 20 y.o. male who presents with concerns about sexually transmitted disease. Current symptoms are: none. Reports recent STI screening at [facility]. Reports everything was negative except for Herpes Type 2. He denies current active lesions or history of penile or genital lesions. He was prescribed valtrex therapy which he is continuing to take but presents today for second opinion regarding diagnosis. He presents labs with Negative HSV-2 IgM and Positive HSV-2 IgG. We discussed in detail, that labs represent prior exposure to HSV 2 virus, no signs of active infection without genital lesions. However, okay to complete anti-viral therapy. Patient is requesting repeat lab work. Sexual history reviewed with the patient. Multiple female partners. No unprotected sex since last STI screening several weeks ago. Previous STD history: none; except for new diagnosis of HSV 2. HIV Status: the patient is HIV negative. Contraception: condoms    History of epidydymitis in January of 2019, treated and resolved. STI testing at that time was negative as well. Current Outpatient Medications   Medication Sig Dispense Refill    valACYclovir (VALTREX) 1 gram tablet Take 1,000 mg by mouth.  valACYclovir (VALTREX) 1 gram tablet TK 1 T PO BID FOR 7 DAYS  0    omega-3 acid ethyl esters (LOVAZA) 1 gram capsule       ascorbic acid, vitamin C, (VITAMIN C) 250 mg tablet Take  by mouth.  creatine monohydrate 5,000 mg pwpk Take  by mouth.  omega 3-dha-epa-fish oil 100-160-1,000 mg cap Take  by mouth.       multivitamin (ONE A DAY) tablet Take 1 Tab by mouth daily. No Known Allergies    ROS:   Complete review of systems was reviewed with pertinent information listed in HPI. Review of Systems   Constitutional: Negative for chills, diaphoresis, fever, malaise/fatigue and weight loss. HENT: Negative for congestion, ear pain, hearing loss, sinus pain, sore throat and tinnitus. Eyes: Negative for blurred vision and double vision. Respiratory: Negative for shortness of breath. Cardiovascular: Negative for chest pain, palpitations and leg swelling. Gastrointestinal: Negative for abdominal pain, blood in stool, constipation, diarrhea, heartburn, melena, nausea and vomiting. Genitourinary: Negative for dysuria, frequency, hematuria and urgency. Musculoskeletal: Negative for joint pain and myalgias. Skin: Negative for itching and rash. Neurological: Negative for dizziness, tingling, weakness and headaches. Endo/Heme/Allergies: Negative for polydipsia. Psychiatric/Behavioral: Negative. Objective:     Visit Vitals  /75 (BP 1 Location: Left arm, BP Patient Position: Sitting)   Pulse 65   Temp 97.5 °F (36.4 °C) (Oral)   Resp 16   Ht 5' 10\" (1.778 m)   Wt 200 lb (90.7 kg)   SpO2 99%   BMI 28.70 kg/m²       Vitals and Nurse Documentation reviewed. Physical Exam   Constitutional: He is oriented to person, place, and time and well-developed, well-nourished, and in no distress. HENT:   Head: Normocephalic and atraumatic. Cardiovascular: Normal rate. Pulmonary/Chest: Effort normal.   Neurological: He is alert and oriented to person, place, and time. Gait normal.   Skin: Skin is warm and dry. Psychiatric: Mood, memory, affect and judgment normal.   Nursing note and vitals reviewed. Assessment/Plan:     Diagnoses and all orders for this visit:    1. Exposure to herpes simplex virus (HSV): History of Positive HSV-2 IgG, Negative HSV-2 IgM on routine STI screening at [facility].  No history of or current active genital lesions. He has completed antiviral therapy. He is requesting repeat HSV testing, we discussed in detail his labs represent prior exposure and there will likely be no change but he prefers repeat testing for confirmation. Decline repeat STI screening. Safer sex reviewed. -     HSV1/HSV2(IGG/M)      Follow-up and Dispositions    · Return if symptoms worsen or fail to improve.          Discussed expected course/resolution/complications of diagnosis in detail with patient.    Medication risks/benefits/costs/interactions/alternatives discussed with patient.    Pt was given an after visit summary which includes diagnoses, current medications & vitals.  Pt expressed understanding with the diagnosis and plan

## 2019-04-08 NOTE — PATIENT INSTRUCTIONS
Learning About Safer Sex for Teens  What is safer sex? Safer sex is a way to help you avoid an infection spread through sex. It can also help prevent pregnancy. It may seems strange or uncomfortable to talk about sex. But the more you know, the safer you are. And the actions you take before sex can help keep you from getting an infection like herpes or a deadly infection like HIV. You can get a sexually transmitted infection (STI) from any kind of sexual contact, not just intercourse. STIs are spread through skin-to-skin contact between the genitals. You can also get an STI from contact with body fluids such as semen, vaginal fluids, and blood (including menstrual blood). This means you can get an STI from vaginal sex, anal sex, or oral sex. You may have heard this before, but not having sex at all is still the best way to prevent pregnancy and any STI. How can you protect yourself from STIs? A condom is one of the best ways to lower your chance of STIs. You may know about condoms for men. Did you know there are condoms for women too? The female condom is a tube of soft plastic with a closed end that is put deep into the vagina. · Use condoms or female condoms each time and every time you have sex. ? Condoms come in several sizes. Make sure you use the right size. A condom that is too small can break easily. A condom that is too big can slip off during sex. Use a new condom each time you have sex. ? Be careful not to poke a hole in the condom when you open the wrapper. ? Never use petroleum jelly (such as Vaseline), grease, hand lotion, baby oil, or anything with oil in it. These products can make holes in the condom. ? After sex, hold the condom on your penis as you remove your penis from your partner. This will keep semen from spilling out of the condom. · Do not use a female condom and male condom at the same time.   · Do not have sex with anyone who has symptoms of an STI, such as sores on the genitals or mouth. The herpes virus that causes cold sores can spread to and from the penis and vagina. · Think about getting shots to prevent hepatitis A and hepatitis B, if you haven't already had these shots. You can get both of these diseases through sex. A dental dam is a special rubber sheet that you can use for protection during oral sex. How can you prevent pregnancy? Remember that birth control methods such as diaphragms, IUDs, foams, and birth control pills do not stop you from getting STIs. These are some safer sex things you can do to help avoid pregnancy:  · Use some type of birth control every time you have sex. · Don't drink a lot of alcohol or use drugs before sex. This can cause you to let down your guard. And then you're not thinking clearly about safer sex. How else can you take care of yourself? · Talk to your partner before you have sex. Find out if he or she has or is at risk for any STI. Keep in mind that a person may be able to spread an STI even if he or she does not have symptoms. You and your partner may want to get an HIV test. You should get tested again 6 months later. · You should never feel pressured to have sex. It's okay to say \"no\" anytime you want to stop. · It's important to feel safe with your sex partner and with the activities you are doing together. If you don't feel safe, talk with an adult you trust.  Follow-up care is a key part of your treatment and safety. Be sure to make and go to all appointments, and call your doctor if you are having problems. It's also a good idea to know your test results and keep a list of the medicines you take. Where can you learn more? Go to http://felipa-ismael.info/. Enter P218 in the search box to learn more about \"Learning About Safer Sex for Teens. \"  Current as of: September 11, 2018  Content Version: 11.9  © 8950-4367 [employer].  Care instructions adapted under license by [employer] (which disclaims liability or warranty for this information). If you have questions about a medical condition or this instruction, always ask your healthcare professional. [address] any warranty or liability for your use of this information.

## 2019-04-08 NOTE — PROGRESS NOTES
Chief Complaint   Patient presents with   [facility]     pt is asking for STD screening. pt seen at [address] [place] [address] and is being treated for herpes. \"REVIEWED RECORD IN PREPARATION FOR VISIT AND HAVE OBTAINED THE NECESSARY DOCUMENTATION\"  1. Have you been to the ER, urgent care clinic since your last visit? Hospitalized since your last visit? Yes When: seen at [facility] for herpes Where: herpes Reason for visit: herpes    2. Have you seen or consulted any other health care providers outside of the [address] [place] [address] since your last visit? Include any pap smears or colon screening.  Yes Where: same as above

## 2019-04-09 ENCOUNTER — OFFICE VISIT (OUTPATIENT)
Dept: FAMILY MEDICINE CLINIC | Age: 24
End: 2019-04-09

## 2019-04-09 VITALS
WEIGHT: 200 LBS | DIASTOLIC BLOOD PRESSURE: 68 MMHG | BODY MASS INDEX: 28.63 KG/M2 | SYSTOLIC BLOOD PRESSURE: 129 MMHG | OXYGEN SATURATION: 99 % | TEMPERATURE: 98 F | HEART RATE: 52 BPM | HEIGHT: 70 IN | RESPIRATION RATE: 16 BRPM

## 2019-04-09 DIAGNOSIS — Z20.828 EXPOSURE TO HERPES SIMPLEX VIRUS (HSV): Primary | ICD-10-CM

## 2019-04-09 NOTE — PROGRESS NOTES
[place] [facility] Note  Subjective:      Hugo Darby is a 20 y.o. male who presents for an acute visit with the following chief complaints. Chief Complaint   Patient presents with    Skin Problem     pt asking to be checked to see if he is having an outbreak of herpes. Evaluated yesterday for second opinion regarding herpes lab results that were positive for HSV-2 IgG, found on routine STI screening. He was treated at [facility] with Valtrex for 1 week despite no history of active genitle lesions. He denies active lesions yesterday and declined exam. Here today for  exam.     Current Outpatient Medications   Medication Sig Dispense Refill    valACYclovir (VALTREX) 1 gram tablet TK 1 T PO BID FOR 7 DAYS  0    omega-3 acid ethyl esters (LOVAZA) 1 gram capsule       creatine monohydrate 5,000 mg pwpk Take  by mouth.  omega 3-dha-epa-fish oil 100-160-1,000 mg cap Take  by mouth.  multivitamin (ONE A DAY) tablet Take 1 Tab by mouth daily.  ascorbic acid, vitamin C, (VITAMIN C) 250 mg tablet Take  by mouth. No Known Allergies    ROS:   Complete review of systems was reviewed with pertinent information listed in HPI. Review of Systems   Constitutional: Negative for chills, diaphoresis, fever, malaise/fatigue and weight loss. HENT: Negative for congestion, ear pain, hearing loss, sinus pain, sore throat and tinnitus. Eyes: Negative for blurred vision and double vision. Respiratory: Negative for shortness of breath. Cardiovascular: Negative for chest pain, palpitations and leg swelling. Gastrointestinal: Negative for abdominal pain, blood in stool, constipation, diarrhea, heartburn, melena, nausea and vomiting. Genitourinary: Negative for dysuria, frequency, hematuria and urgency. Musculoskeletal: Negative for joint pain and myalgias. Skin: Negative for itching and rash.    Neurological: Negative for dizziness, tingling, weakness and headaches. Endo/Heme/Allergies: Negative for polydipsia. Psychiatric/Behavioral: Negative. Objective:     Visit Vitals  /68 (BP 1 Location: Left arm, BP Patient Position: Sitting)   Pulse (!) 52   Temp 98 °F (36.7 °C) (Oral)   Resp 16   Ht 5' 10\" (1.778 m)   Wt 200 lb (90.7 kg)   SpO2 99%   BMI 28.70 kg/m²       Vitals and Nurse Documentation reviewed. Physical Exam   Genitourinary: Testes/scrotum normal. He exhibits no abnormal testicular mass, no testicular tenderness, no abnormal scrotal mass and no epididymal tenderness. Cremasteric reflex is present. Penis exhibits no lesions and no edema. No discharge found. Nursing note and vitals reviewed. Assessment/Plan:     Diagnoses and all orders for this visit:    1. Exposure to herpes simplex virus (HSV): History of positive HSV-2 IgG,nNegative HSV-2 IgM on routine STI screening at [facility], treated with Valtrex x 1 week by previous provider. No history of or current active genital lesions. No abnormal findings on  exam today. Safer sex reviewed again today.      Discussed expected course/resolution/complications of diagnosis in detail with patient.    Medication risks/benefits/costs/interactions/alternatives discussed with patient.    Pt was given an after visit summary which includes diagnoses, current medications & vitals.  Pt expressed understanding with the diagnosis and plan

## 2019-04-10 LAB
HSV1 IGG SER IA-ACNC: <0.91 INDEX (ref 0–0.9)
HSV1 IGM TITR SER IF: ABNORMAL TITER
HSV2 IGG SER IA-ACNC: 6.74 INDEX (ref 0–0.9)
HSV2 IGM TITR SER IF: ABNORMAL TITER

## 2019-04-10 NOTE — PROGRESS NOTES
Mychart message:   Micky Gtz,     Your labs are as I anticipated. Negative for HSV-1. Positive IgG antibodies for HSV-2 indicating past exposure, but negative for acute antibodies. Please let me know if you have any additional questions.     Edgar Lee NP

## 2020-01-10 ENCOUNTER — OFFICE VISIT (OUTPATIENT)
Dept: FAMILY MEDICINE CLINIC | Age: 25
End: 2020-01-10

## 2020-01-10 VITALS
HEIGHT: 70 IN | BODY MASS INDEX: 30.92 KG/M2 | TEMPERATURE: 98.1 F | RESPIRATION RATE: 16 BRPM | HEART RATE: 68 BPM | SYSTOLIC BLOOD PRESSURE: 134 MMHG | WEIGHT: 216 LBS | DIASTOLIC BLOOD PRESSURE: 75 MMHG | OXYGEN SATURATION: 98 %

## 2020-01-10 DIAGNOSIS — L60.8 TOENAIL DEFORMITY: ICD-10-CM

## 2020-01-10 DIAGNOSIS — R74.8 ELEVATED LIVER ENZYMES: ICD-10-CM

## 2020-01-10 DIAGNOSIS — M79.674 PAIN OF TOE OF RIGHT FOOT: Primary | ICD-10-CM

## 2020-01-10 PROBLEM — D72.819 LEUKOPENIA: Status: ACTIVE | Noted: 2019-03-27

## 2020-01-10 PROBLEM — J30.9 ALLERGIC RHINITIS: Status: ACTIVE | Noted: 2019-03-26

## 2020-01-10 RX ORDER — OSELTAMIVIR PHOSPHATE 75 MG/1
CAPSULE ORAL
COMMUNITY
Start: 2019-11-14 | End: 2020-01-10 | Stop reason: ALTCHOICE

## 2020-01-10 RX ORDER — OSELTAMIVIR PHOSPHATE 75 MG/1
75 CAPSULE ORAL
COMMUNITY
Start: 2019-11-14 | End: 2020-01-10 | Stop reason: ALTCHOICE

## 2020-01-10 RX ORDER — BENZONATATE 100 MG/1
CAPSULE ORAL
COMMUNITY
Start: 2019-11-14 | End: 2020-01-10

## 2020-01-10 NOTE — PROGRESS NOTES
[facility] Note  Subjective:      Fletcher Almanza is a 30 y.o. male who presents for an acute visit with the following chief complaints. Chief Complaint   Patient presents with    Foot Pain     big toe on right foot has been hurting since patient was playing basketball 2 month ago. pt states it hurts to much to bend his right foot now. Right foot pain. Onset about 2 months ago, gradual improvement since. Base of the right big toe. Inciting event. event: Injury playing basketball 2 months ago foot collided with someone else, immediate pain. Limping for the first few days and was painful to touch. Initially improved over the next 1-2 weeks. But has been stable since. Feels like discomfort, intermittent. Aggravated with with bending toe, going on toes, bumping it, prolonged walking. Alleviated: rest. Treatment: none. Current Outpatient Medications   Medication Sig Dispense Refill    ascorbic acid, vitamin C, (VITAMIN C) 250 mg tablet Take  by mouth.  creatine monohydrate 5,000 mg pwpk Take  by mouth.  omega 3-dha-epa-fish oil 100-160-1,000 mg cap Take  by mouth.  multivitamin (ONE A DAY) tablet Take 1 Tab by mouth daily. No Known Allergies    ROS:   Complete review of systems was reviewed with pertinent information listed in HPI. Review of Systems   Constitutional: Negative for chills, diaphoresis, fever, malaise/fatigue and weight loss. HENT: Negative for congestion, ear pain, hearing loss, sinus pain, sore throat and tinnitus. Eyes: Negative for blurred vision and double vision. Respiratory: Negative for cough and shortness of breath. Cardiovascular: Negative for chest pain, palpitations and leg swelling. Gastrointestinal: Negative for abdominal pain, blood in stool, constipation, diarrhea, heartburn, melena, nausea and vomiting. Genitourinary: Negative for dysuria, frequency, hematuria and urgency.    Musculoskeletal: Positive for joint pain. Negative for myalgias. Skin: Negative for itching and rash. Neurological: Negative for dizziness, tingling, weakness and headaches. Endo/Heme/Allergies: Negative for polydipsia. Psychiatric/Behavioral: Negative. Objective:     Visit Vitals  /75 (BP 1 Location: Right arm, BP Patient Position: Sitting)   Pulse 68   Temp 98.1 °F (36.7 °C) (Oral)   Resp 16   Ht 5' 10\" (1.778 m)   Wt 216 lb (98 kg)   SpO2 98%   BMI 30.99 kg/m²       Vitals and Nurse Documentation reviewed. Physical Exam  Vitals signs and nursing note reviewed. Constitutional:       General: He is not in acute distress. Appearance: Normal appearance. He is normal weight. He is not ill-appearing, toxic-appearing or diaphoretic. HENT:      Head: Normocephalic and atraumatic. Cardiovascular:      Rate and Rhythm: Normal rate. Pulses:           Dorsalis pedis pulses are 2+ on the right side and 2+ on the left side. Posterior tibial pulses are 2+ on the right side and 2+ on the left side. Pulmonary:      Effort: Pulmonary effort is normal.   Musculoskeletal:      Right foot: Normal range of motion. No deformity, bunion, Charcot foot, foot drop or prominent metatarsal heads. Left foot: Normal range of motion. No deformity, bunion, Charcot foot, foot drop or prominent metatarsal heads. Feet:       Comments: Right foot: Right foot PIP joint tenderness. No swelling. No DIP or MTP joint pain. Left foot, great toe: Thickened toenail with hyperpigmentation of distal nail. Feet:      Right foot:      Skin integrity: No ulcer, blister, skin breakdown, erythema, warmth, callus, dry skin or fissure. Toenail Condition: Right toenails are abnormally thick. Left foot:      Skin integrity: Skin integrity normal. No ulcer, blister, skin breakdown, erythema, warmth, callus, dry skin or fissure.       Toenail Condition: Left toenails are normal.   Skin:     Capillary Refill: Capillary refill takes less than 2 seconds. Neurological:      Mental Status: He is alert. Assessment/Plan:     Diagnoses and all orders for this visit:    1. Pain of toe of right foot: No abnormal findings on exam. Advised conservative therapy with icing, NSAID's. Follow-up with ortho foot specialist or podiatry for continued symptoms. 2. Elevated liver enzymes: repeat today. -     METABOLIC PANEL, COMPREHENSIVE    3. Toenail deformity: mildly thickened nail. Toenail culture pending.   -     CULTURE, FUNGUS, SKIN, HAIR, NAIL      Follow-up and Dispositions    · Return if symptoms worsen or fail to improve.          Discussed expected course/resolution/complications of diagnosis in detail with patient.    Medication risks/benefits/costs/interactions/alternatives discussed with patient.    Pt was given an after visit summary which includes diagnoses, current medications & vitals.  Pt expressed understanding with the diagnosis and plan

## 2020-01-10 NOTE — PATIENT INSTRUCTIONS
Podiatry - Teri Coats. Ortho [facility]. Foot Pain: Care Instructions  Your Care Instructions  Foot injuries that cause pain and swelling are fairly common. Almost all sports or home repair projects can cause a misstep that ends up as foot pain. Normal wear and tear, especially as you get older, also can cause foot pain. Most minor foot injuries will heal on their own, and home treatment is usually all you need to do. If you have a severe injury, you may need tests and treatment. Follow-up care is a key part of your treatment and safety. Be sure to make and go to all appointments, and call your doctor if you are having problems. It's also a good idea to know your test results and keep a list of the medicines you take. How can you care for yourself at home? · Take pain medicines exactly as directed. ? If the doctor gave you a prescription medicine for pain, take it as prescribed. ? If you are not taking a prescription pain medicine, ask your doctor if you can take an over-the-counter medicine. · Rest and protect your foot. Take a break from any activity that may cause pain. · Put ice or a cold pack on your foot for 10 to 20 minutes at a time. Put a thin cloth between the ice and your skin. · Prop up the sore foot on a pillow when you ice it or anytime you sit or lie down during the next 3 days. Try to keep it above the level of your heart. This will help reduce swelling. · Your doctor may recommend that you wrap your foot with an elastic bandage. Keep your foot wrapped for as long as your doctor advises. · If your doctor recommends crutches, use them as directed. · Wear roomy footwear. · As soon as pain and swelling end, begin gentle exercises of your foot. Your doctor can tell you which exercises will help. When should you call for help? Call 911 anytime you think you may need emergency care. For example, call if:    · Your foot turns pale, white, blue, or cold.  Call your doctor now or seek immediate medical care if:    · You cannot move or stand on your foot.     · Your foot looks twisted or out of its normal position.     · Your foot is not stable when you step down.     · You have signs of infection, such as:  ? Increased pain, swelling, warmth, or redness. ? Red streaks leading from the sore area. ? Pus draining from a place on your foot. ? A fever.     · Your foot is numb or tingly.    Watch closely for changes in your health, and be sure to contact your doctor if:    · You do not get better as expected.     · You have bruises from an injury that last longer than 2 weeks. Where can you learn more? Go to http://felipa-ismael.info/. Enter V617 in the search box to learn more about \"Foot Pain: Care Instructions. \"  Current as of: June 26, 2019  Content Version: 12.2  © 5073-8374 [employer]. Care instructions adapted under license by [employer] (which disclaims liability or warranty for this information). If you have questions about a medical condition or this instruction, always ask your healthcare professional. [place] [address] any warranty or liability for your use of this information.

## 2020-01-11 LAB
ALBUMIN SERPL-MCNC: 4.6 G/DL (ref 3.5–5.5)
ALBUMIN/GLOB SERPL: 1.3 {RATIO} (ref 1.2–2.2)
ALP SERPL-CCNC: 69 IU/L (ref 39–117)
ALT SERPL-CCNC: 31 IU/L (ref 0–44)
AST SERPL-CCNC: 38 IU/L (ref 0–40)
BILIRUB SERPL-MCNC: 0.5 MG/DL (ref 0–1.2)
BUN SERPL-MCNC: 17 MG/DL (ref 6–20)
BUN/CREAT SERPL: 16 (ref 9–20)
CALCIUM SERPL-MCNC: 9.8 MG/DL (ref 8.7–10.2)
CHLORIDE SERPL-SCNC: 104 MMOL/L (ref 96–106)
CO2 SERPL-SCNC: 18 MMOL/L (ref 20–29)
CREAT SERPL-MCNC: 1.09 MG/DL (ref 0.76–1.27)
GLOBULIN SER CALC-MCNC: 3.6 G/DL (ref 1.5–4.5)
GLUCOSE SERPL-MCNC: 79 MG/DL (ref 65–99)
POTASSIUM SERPL-SCNC: 4.4 MMOL/L (ref 3.5–5.2)
PROT SERPL-MCNC: 8.2 G/DL (ref 6–8.5)
SODIUM SERPL-SCNC: 138 MMOL/L (ref 134–144)

## 2020-01-14 NOTE — PROGRESS NOTES
Rob Cano,     Your labs look great. Please let me know if you have any additional questions.     Emilee Moran NP

## 2021-05-11 ENCOUNTER — OFFICE VISIT (OUTPATIENT)
Dept: FAMILY MEDICINE CLINIC | Age: 26
End: 2021-05-11
Payer: COMMERCIAL

## 2021-05-11 VITALS
BODY MASS INDEX: 30.12 KG/M2 | SYSTOLIC BLOOD PRESSURE: 125 MMHG | DIASTOLIC BLOOD PRESSURE: 71 MMHG | HEIGHT: 70 IN | HEART RATE: 85 BPM | OXYGEN SATURATION: 97 % | TEMPERATURE: 98.5 F | WEIGHT: 210.4 LBS | RESPIRATION RATE: 18 BRPM

## 2021-05-11 DIAGNOSIS — Z11.3 ROUTINE SCREENING FOR STI (SEXUALLY TRANSMITTED INFECTION): ICD-10-CM

## 2021-05-11 DIAGNOSIS — Z00.00 ROUTINE ADULT HEALTH MAINTENANCE: Primary | ICD-10-CM

## 2021-05-11 DIAGNOSIS — Z13.220 ENCOUNTER FOR SCREENING FOR LIPID DISORDER: ICD-10-CM

## 2021-05-11 DIAGNOSIS — D72.819 LEUKOPENIA, UNSPECIFIED TYPE: ICD-10-CM

## 2021-05-11 LAB
COMMENT, HOLDF: NORMAL
SAMPLES BEING HELD,HOLD: NORMAL

## 2021-05-11 PROCEDURE — 99395 PREV VISIT EST AGE 18-39: CPT | Performed by: NURSE PRACTITIONER

## 2021-05-11 NOTE — PATIENT INSTRUCTIONS
Well Visit, Ages 25 to 48: Care Instructions Overview Well visits can help you stay healthy. Your doctor has checked your overall health and may have suggested ways to take good care of yourself. Your doctor also may have recommended tests. At home, you can help prevent illness with healthy eating, regular exercise, and other steps. Follow-up care is a key part of your treatment and safety. Be sure to make and go to all appointments, and call your doctor if you are having problems. It's also a good idea to know your test results and keep a list of the medicines you take. How can you care for yourself at home? · Get screening tests that you and your doctor decide on. Screening helps find diseases before any symptoms appear. · Eat healthy foods. Choose fruits, vegetables, whole grains, protein, and low-fat dairy foods. Limit fat, especially saturated fat. Reduce salt in your diet. · Limit alcohol. If you are a man, have no more than 2 drinks a day or 14 drinks a week. If you are a woman, have no more than 1 drink a day or 7 drinks a week. · Get at least 30 minutes of physical activity on most days of the week. Walking is a good choice. You also may want to do other activities, such as running, swimming, cycling, or playing tennis or team sports. Discuss any changes in your exercise program with your doctor. · Reach and stay at a healthy weight. This will lower your risk for many problems, such as obesity, diabetes, heart disease, and high blood pressure. · Do not smoke or allow others to smoke around you. If you need help quitting, talk to your doctor about stop-smoking programs and medicines. These can increase your chances of quitting for good. · Care for your mental health. It is easy to get weighed down by worry and stress. Learn strategies to manage stress, like deep breathing and mindfulness, and stay connected with your family and community.  If you find you often feel sad or hopeless, talk with your doctor. Treatment can help. · Talk to your doctor about whether you have any risk factors for sexually transmitted infections (STIs). You can help prevent STIs if you wait to have sex with a new partner (or partners) until you've each been tested for STIs. It also helps if you use condoms (male or female condoms) and if you limit your sex partners to one person who only has sex with you. Vaccines are available for some STIs, such as HPV. · Use birth control if it's important to you to prevent pregnancy. Talk with your doctor about the choices available and what might be best for you. · If you think you may have a problem with alcohol or drug use, talk to your doctor. This includes prescription medicines (such as amphetamines and opioids) and illegal drugs (such as cocaine and methamphetamine). Your doctor can help you figure out what type of treatment is best for you. · Protect your skin from too much sun. When you're outdoors from 10 a.m. to 4 p.m., stay in the shade or cover up with clothing and a hat with a wide brim. Wear sunglasses that block UV rays. Even when it's cloudy, put broad-spectrum sunscreen (SPF 30 or higher) on any exposed skin. · See a dentist one or two times a year for checkups and to have your teeth cleaned. · Wear a seat belt in the car. When should you call for help? Watch closely for changes in your health, and be sure to contact your doctor if you have any problems or symptoms that concern you. Where can you learn more? Go to http://www.[employer].com/ Enter P072 in the search box to learn more about \"Well Visit, Ages 25 to 48: Care Instructions. \" Current as of: May 27, 2020               Content Version: 12.8 © 1455-9241 [employer]. Care instructions adapted under license by [employer] (which disclaims liability or warranty for this information).  If you have questions about a medical condition or this instruction, always ask your healthcare professional. [place] [address] any warranty or liability for your use of this information.

## 2021-05-11 NOTE — PROGRESS NOTES
[facility] Note  HPI:   Yulisa Jimenez is a 30 y.o. male who presents for annual exam.  Chief Complaint   Patient presents with    Complete Physical     No current issues. Requesting routine labs including STI screening and lipid screening. Health Maintenance   Topic Date Due    COVID-19 Vaccine (1) Never done    Flu Vaccine (Season Ended) 09/01/2021    DTaP/Tdap/Td series (2 - Td) 08/07/2022    Hepatitis C Screening  Completed    Pneumococcal 0-64 years  Aged Out     Health Maintenance reviewed    Current Outpatient Medications   Medication Sig Dispense Refill    ascorbic acid, vitamin C, (VITAMIN C) 250 mg tablet Take  by mouth.  creatine monohydrate 5,000 mg pwpk Take  by mouth.  omega 3-dha-epa-fish oil 100-160-1,000 mg cap Take  by mouth.  multivitamin (ONE A DAY) tablet Take 1 Tab by mouth daily. No Known Allergies   Immunization History   Administered Date(s) Administered    Tdap 08/07/2012     Patient Active Problem List   Diagnosis Code    Leukopenia D72.819    Allergic rhinitis J30.9     Past Medical History:   Diagnosis Date    Chronic leukopenia     Epididymitis     HSV-2 seropositive     No active lesions ever       Past Surgical History:   Procedure Laterality Date    HX WISDOM TEETH EXTRACTION        No LMP for male patient.    Family History   Problem Relation Age of Onset    No Known Problems Mother     Hypertension Father     No Known Problems Sister     No Known Problems Brother     No Known Problems Maternal Grandmother     No Known Problems Maternal Grandfather     No Known Problems Paternal Grandmother     No Known Problems Paternal Grandfather     No Known Problems Sister     No Known Problems Sister     No Known Problems Sister       Social History     Socioeconomic History    Marital status: SINGLE     Spouse name: Not on file    Number of children: Not on file    Years of education: Not on file   [employer] Highest education level: Not on file   Occupational History    Not on file   Social Needs    Financial resource strain: Not on file    Food insecurity     Worry: Not on file     Inability: Not on file    Transportation needs     Medical: Not on file     Non-medical: Not on file   Tobacco Use    Smoking status: Former Smoker     Types: Cigarettes    Smokeless tobacco: Never Used   Substance and Sexual Activity    Alcohol use: No    Drug use: No    Sexual activity: Yes     Partners: Female     Birth control/protection: None   Lifestyle    Physical activity     Days per week: Not on file     Minutes per session: Not on file    Stress: Not on file   Relationships    Social connections     Talks on phone: Not on file     Gets together: Not on file     Attends [employer] service: Not on file     Active member of club or organization: Not on file     Attends meetings of clubs or organizations: Not on file     Relationship status: Not on file    Intimate partner violence     Fear of current or ex partner: Not on file     Emotionally abused: Not on file     Physically abused: Not on file     Forced sexual activity: Not on file   Other Topics Concern    Not on file   Social History Narrative    Working at [employer]     Exercise: 5-6 times per week - lifting     Diet: generally healthy             ROS:     Review of Systems   Constitutional: Negative for chills, diaphoresis, fever, malaise/fatigue and weight loss. HENT: Negative for congestion, ear pain, hearing loss, sinus pain, sore throat and tinnitus. Eyes: Negative for blurred vision and double vision. Respiratory: Negative for cough, hemoptysis, sputum production, shortness of breath and wheezing. Cardiovascular: Negative for chest pain, palpitations and leg swelling. Gastrointestinal: Negative for abdominal pain, blood in stool, constipation, diarrhea, heartburn, melena, nausea and vomiting.    Genitourinary: Negative for dysuria, frequency, hematuria and urgency. Musculoskeletal: Negative for back pain, joint pain, myalgias and neck pain. Skin: Negative for itching and rash. Neurological: Negative for dizziness, tingling, weakness and headaches. Endo/Heme/Allergies: Negative for polydipsia. Psychiatric/Behavioral: Negative. Physical Exam:     Visit Vitals  /71 (BP 1 Location: Left upper arm, BP Patient Position: Sitting, BP Cuff Size: Large adult)   Pulse 85   Temp 98.5 °F (36.9 °C) (Temporal)   Resp 18   Ht 5' 10\" (1.778 m)   Wt 210 lb 6.4 oz (95.4 kg)   SpO2 97%   BMI 30.19 kg/m²        Physical Exam  Vitals signs and nursing note reviewed. Constitutional:       General: He is not in acute distress. Appearance: Normal appearance. He is well-developed and normal weight. He is not ill-appearing, toxic-appearing or diaphoretic. HENT:      Head: Normocephalic and atraumatic. Right Ear: Hearing, ear canal and external ear normal. No middle ear effusion. Tympanic membrane is not perforated or erythematous. Left Ear: Hearing, ear canal and external ear normal.  No middle ear effusion. Tympanic membrane is not perforated or erythematous. Nose: Nose normal. No nasal deformity, mucosal edema or rhinorrhea. Right Sinus: No maxillary sinus tenderness or frontal sinus tenderness. Left Sinus: No maxillary sinus tenderness or frontal sinus tenderness. Mouth/Throat:      Mouth: Mucous membranes are not pale and not dry. Dentition: Normal dentition. Pharynx: Uvula midline. No oropharyngeal exudate or posterior oropharyngeal erythema. Tonsils: No tonsillar abscesses. Eyes:      General: Lids are normal.      Conjunctiva/sclera: Conjunctivae normal.      Right eye: Right conjunctiva is not injected. Left eye: Left conjunctiva is not injected. Pupils: Pupils are equal, round, and reactive to light. Neck:      Musculoskeletal: Normal range of motion and neck supple.       Thyroid: No thyromegaly. Vascular: No carotid bruit. Trachea: Phonation normal. No tracheal deviation. Cardiovascular:      Rate and Rhythm: Normal rate and regular rhythm. Pulses:           Radial pulses are 2+ on the right side and 2+ on the left side. Posterior tibial pulses are 2+ on the right side and 2+ on the left side. Heart sounds: Normal heart sounds, S1 normal and S2 normal. No murmur. No friction rub. No gallop. Pulmonary:      Effort: Pulmonary effort is normal. No respiratory distress. Breath sounds: Normal breath sounds. No decreased breath sounds, wheezing, rhonchi or rales. Chest:      Chest wall: No tenderness. Abdominal:      General: Bowel sounds are normal. There is no distension or abdominal bruit. Palpations: Abdomen is soft. There is no mass or pulsatile mass. Tenderness: There is no abdominal tenderness. There is no guarding or rebound. Musculoskeletal: Normal range of motion. General: No tenderness or deformity. Lymphadenopathy:      Cervical: No cervical adenopathy. Upper Body:      Right upper body: No supraclavicular adenopathy. Left upper body: No supraclavicular adenopathy. Skin:     General: Skin is warm and dry. Nails: There is no clubbing. Neurological:      Mental Status: He is alert and oriented to person, place, and time. Cranial Nerves: No cranial nerve deficit. Gait: Gait normal.   Psychiatric:         Behavior: Behavior normal.         Thought Content: Thought content normal.         Judgment: Judgment normal.            Assessment/ Plan:   Full age appropriate History and Physical exam as well as health care maintenance  performed and discussed today. Risk factor modification discussed today includes safe sex practices, healthy diet and exercise, and seat belt use. Continue current medications. Diagnoses and all orders for this visit:    1. Routine adult health maintenance: Healthy 30 y.o. male, without abnormal findings on exam  -     METABOLIC PANEL, COMPREHENSIVE; Future  -     CBC WITH AUTOMATED DIFF; Future    2. Routine screening for STI (sexually transmitted infection): safer sex reviewed. -     RPR; Future  -     HEPATITIS PANEL, ACUTE; Future  -     CT/NG/T.VAGINALIS AMPLIFICATION; Future  -     HIV 1/2 AG/AB, 4TH GENERATION,W RFLX CONFIRM; Future    3. Encounter for screening for lipid disorder  -     LIPID PANEL; Future    4. Leukopenia, unspecified type: [place] reports unremarkable bone marrow biopsy.   -     CBC WITH AUTOMATED DIFF; Future      Follow-up and Dispositions    · Return in about 1 year (around 5/11/2022) for Routine Physical Exam.        Discussed expected course/resolution/complications of diagnosis in detail with patient.    Medication risks/benefits/costs/interactions/alternatives discussed with patient.    Pt was given an after visit summary which includes diagnoses, current medications & vitals.  Pt expressed understanding with the diagnosis and plan.

## 2021-05-11 NOTE — PROGRESS NOTES
Identified pt with two pt identifiers(name and ). Reviewed record in preparation for visit and have obtained necessary documentation. Chief Complaint   Patient presents with    Complete Physical        Vitals:    21 1505   Weight: 210 lb 6.4 oz (95.4 kg)   Height: 5' 10\" (1.778 m)   PainSc:   2   PainLoc: Chest       Health Maintenance Due   Topic    COVID-19 Vaccine (1)       Coordination of Care Questionnaire:  :   1) Have you been to an emergency room, urgent care, or hospitalized since your last visit? If yes, where when, and reason for visit? no       2. Have seen or consulted any other health care provider since your last visit? If yes, where when, and reason for visit? NO      Patient is accompanied by self I have received verbal consent from Luis De León to discuss any/all medical information while they are present in the room.

## 2021-05-12 LAB
ALBUMIN SERPL-MCNC: 4 G/DL (ref 3.5–5)
ALBUMIN/GLOB SERPL: 1 {RATIO} (ref 1.1–2.2)
ALP SERPL-CCNC: 72 U/L (ref 45–117)
ALT SERPL-CCNC: 49 U/L (ref 12–78)
ANION GAP SERPL CALC-SCNC: 8 MMOL/L (ref 5–15)
AST SERPL-CCNC: 44 U/L (ref 15–37)
BILIRUB SERPL-MCNC: 0.3 MG/DL (ref 0.2–1)
BUN SERPL-MCNC: 14 MG/DL (ref 6–20)
BUN/CREAT SERPL: 12 (ref 12–20)
CALCIUM SERPL-MCNC: 9.2 MG/DL (ref 8.5–10.1)
CHLORIDE SERPL-SCNC: 106 MMOL/L (ref 97–108)
CHOLEST SERPL-MCNC: 129 MG/DL
CO2 SERPL-SCNC: 26 MMOL/L (ref 21–32)
CREAT SERPL-MCNC: 1.14 MG/DL (ref 0.7–1.3)
GLOBULIN SER CALC-MCNC: 3.9 G/DL (ref 2–4)
GLUCOSE SERPL-MCNC: 84 MG/DL (ref 65–100)
HAV IGM SER QL: NONREACTIVE
HBV CORE IGM SER QL: NONREACTIVE
HBV SURFACE AG SER QL: <0.1 INDEX
HBV SURFACE AG SER QL: NEGATIVE
HCV AB SERPL QL IA: NONREACTIVE
HCV COMMENT,HCGAC: NORMAL
HDLC SERPL-MCNC: 63 MG/DL
HDLC SERPL: 2 {RATIO} (ref 0–5)
HIV 1+2 AB+HIV1 P24 AG SERPL QL IA: NONREACTIVE
HIV12 RESULT COMMENT, HHIVC: NORMAL
LDLC SERPL CALC-MCNC: 46.8 MG/DL (ref 0–100)
LIPID PROFILE,FLP: NORMAL
POTASSIUM SERPL-SCNC: 3.9 MMOL/L (ref 3.5–5.1)
PROT SERPL-MCNC: 7.9 G/DL (ref 6.4–8.2)
RPR SER QL: NONREACTIVE
SODIUM SERPL-SCNC: 140 MMOL/L (ref 136–145)
SP1: NORMAL
SP2: NORMAL
SP3: NORMAL
TRIGL SERPL-MCNC: 96 MG/DL (ref ?–150)
VLDLC SERPL CALC-MCNC: 19.2 MG/DL

## 2021-05-13 DIAGNOSIS — R74.8 ELEVATED LIVER ENZYMES: Primary | ICD-10-CM

## 2021-05-14 LAB
C TRACH RRNA SPEC QL NAA+PROBE: NEGATIVE
N GONORRHOEA RRNA SPEC QL NAA+PROBE: NEGATIVE
SPECIMEN SOURCE: NORMAL
T VAGINALIS RRNA VAG QL NAA+PROBE: NEGATIVE

## 2021-05-14 NOTE — PROGRESS NOTES
Mr. Roman Daubs,     Your cholesterol looks great. Negative HIV, Hepatitis, and syphilis screenings. Your urine screening is still pending. I will be in touch when it results. One of you liver enzymes was very mildly elevated. Many things can cause elevated liver enzymes such as viruses, medications, alcohol, etc. I recommend we recheck your level in 4 weeks. You can schedule a lab only visit to have this done. Please let me know if you have any additional questions.     Mery Miranda NP

## 2021-05-18 NOTE — PROGRESS NOTES
Mr. Anna Trevino,     Your screening for Chlamydia, Gonorrhea, and Trichomoniasis were all negative!      Tj Madsen NP

## 2021-06-14 ENCOUNTER — LAB ONLY (OUTPATIENT)
Dept: FAMILY MEDICINE CLINIC | Age: 26
End: 2021-06-14

## 2021-06-14 DIAGNOSIS — Z00.00 ROUTINE ADULT HEALTH MAINTENANCE: ICD-10-CM

## 2021-06-14 DIAGNOSIS — R74.8 ELEVATED LIVER ENZYMES: ICD-10-CM

## 2021-06-14 DIAGNOSIS — D72.819 LEUKOPENIA, UNSPECIFIED TYPE: ICD-10-CM

## 2021-06-15 LAB
ALBUMIN SERPL-MCNC: 3.7 G/DL (ref 3.5–5)
ALBUMIN/GLOB SERPL: 1.1 {RATIO} (ref 1.1–2.2)
ALP SERPL-CCNC: 74 U/L (ref 45–117)
ALT SERPL-CCNC: 39 U/L (ref 12–78)
AST SERPL-CCNC: 46 U/L (ref 15–37)
BASOPHILS # BLD: 0 K/UL (ref 0–0.1)
BASOPHILS NFR BLD: 1 % (ref 0–1)
BILIRUB DIRECT SERPL-MCNC: 0.2 MG/DL (ref 0–0.2)
BILIRUB SERPL-MCNC: 0.4 MG/DL (ref 0.2–1)
DIFFERENTIAL METHOD BLD: ABNORMAL
EOSINOPHIL # BLD: 0 K/UL (ref 0–0.4)
EOSINOPHIL NFR BLD: 1 % (ref 0–7)
ERYTHROCYTE [DISTWIDTH] IN BLOOD BY AUTOMATED COUNT: 13.7 % (ref 11.5–14.5)
GLOBULIN SER CALC-MCNC: 3.4 G/DL (ref 2–4)
HCT VFR BLD AUTO: 40.2 % (ref 36.6–50.3)
HGB BLD-MCNC: 12.4 G/DL (ref 12.1–17)
IMM GRANULOCYTES # BLD AUTO: 0 K/UL (ref 0–0.04)
IMM GRANULOCYTES NFR BLD AUTO: 0 % (ref 0–0.5)
LYMPHOCYTES # BLD: 1.2 K/UL (ref 0.8–3.5)
LYMPHOCYTES NFR BLD: 53 % (ref 12–49)
MCH RBC QN AUTO: 27.9 PG (ref 26–34)
MCHC RBC AUTO-ENTMCNC: 30.8 G/DL (ref 30–36.5)
MCV RBC AUTO: 90.3 FL (ref 80–99)
MONOCYTES # BLD: 0.3 K/UL (ref 0–1)
MONOCYTES NFR BLD: 14 % (ref 5–13)
NEUTS SEG # BLD: 0.7 K/UL (ref 1.8–8)
NEUTS SEG NFR BLD: 31 % (ref 32–75)
NRBC # BLD: 0 K/UL (ref 0–0.01)
NRBC BLD-RTO: 0 PER 100 WBC
PLATELET # BLD AUTO: 191 K/UL (ref 150–400)
PLATELET COMMENTS,PCOM: ABNORMAL
PMV BLD AUTO: 10.6 FL (ref 8.9–12.9)
PROT SERPL-MCNC: 7.1 G/DL (ref 6.4–8.2)
RBC # BLD AUTO: 4.45 M/UL (ref 4.1–5.7)
RBC MORPH BLD: ABNORMAL
RBC MORPH BLD: ABNORMAL
WBC # BLD AUTO: 2.2 K/UL (ref 4.1–11.1)

## 2021-06-22 DIAGNOSIS — R74.8 ELEVATED LIVER ENZYMES: Primary | ICD-10-CM

## 2021-06-22 DIAGNOSIS — D72.819 LEUKOPENIA, UNSPECIFIED TYPE: ICD-10-CM

## 2021-06-22 NOTE — PROGRESS NOTES
Mr. Dawna Miller. Your WBC remains low. I would like for you to follow-up with our hematologist at [facility] as we never obtained your previous records. See referral information below to schedule. One of your liver enzymes remains mildly elevated. I recommend an ultrasound of your liver to evaluate for fatty liver disease which is a common cause of elevated liver enzymes. Someone will be calling you to set up this exam. Please let me know if you have any additional questions.   MELLISA Giles Dr., MD  [facility] Hematology/On[facility]  Phone: [phone]

## 2022-03-19 PROBLEM — D72.819 LEUKOPENIA: Status: ACTIVE | Noted: 2019-03-27

## 2022-03-20 PROBLEM — J30.9 ALLERGIC RHINITIS: Status: ACTIVE | Noted: 2019-03-26

## 2023-05-20 RX ORDER — ASCORBIC ACID 250 MG
TABLET ORAL
COMMUNITY